# Patient Record
(demographics unavailable — no encounter records)

---

## 2025-03-08 NOTE — LETTER BODY
[FreeTextEntry1] : Gio Wilson MD [address], [place] [address] [place] [address], [address] [phone]  Dear Dr. Wilson,   REASON FOR VISIT: BPH. Recurrent UTI. Elevated PSA.   This is a 85-year-old gentleman with recurrent UTI, elevated PSA, and BPH. Patient is here today for follow-up. Since he was last seen, the patient reports taking Flomax BID and Proscar. Patient reports taking the medications regularly without any side effects or difficulties with the medication. He notes progressive urinary symptoms despite medical therapy. He denies any hematuria or urinary incontinence. His symptoms are aggravated by hydration. He has poorly controlled diabetes. Patient also reports recent UTI. He denies any pain. All other review of systems are negative. He has no cancer in his family medical history. He has no previous surgical history. Past medical history, family history and social history were inquired and were noncontributory to current condition. The patient does not use tobacco or drink alcohol. Medications and allergies were reviewed. He has no known allergies to medication.    On examination, the patient is a healthy-appearing gentleman in no acute distress. He is alert and oriented follows commands. He has normal mood and affect. He is normocephalic. Neck is supple. Oral no thrush. Respirations are unlabored. His abdomen is soft and nontender. Bladder is nonpalpable. No CVA tenderness. Neurologically he is grossly intact. No peripheral edema. Skin without gross abnormality.     His BMP demonstrated decreased renal functions, creatinine 1.64. His PSA was 4.92, which is elevated but age appropriate.  Post-void residual on bladder scan today was 100 cc which has improved. His previous PVR was 400 cc.    ASSESSMENT: BPH. Recurrent UTI. Elevated PSA.    I counseled the patient. In terms of his BPH, the patient reported progressive urinary symptoms despite medical therapy. His PVR today was 100 cc. Patient has not treatment goals. I recommended the patient undergo cystoscopy and UDS to evaluate the urinary outlet. I counseled the patient regarding the procedure. The patient will take the necessary preparations for the procedure.  I renewed the patient's prescription for Flomax BID and Proscar today. I encouraged the patient to continue medications regularly as directed. In terms of his elevated, his PSA is age appropriate. I recommended the patient repeat PSA and BMP to ensure stability. In terms of his recurrent UTI, I recommended the patient obtain urinalysis and urine culture to evaluate for infections. Risks and alternatives were discussed. I answered the patient questions. The patient will follow-up as directed and will contact me with any questions or concerns. Thank you for the opportunity to participate in the care of Mr. NUNES. I will keep you updated on his progress.    Plan: Cystoscopy and UDS. Continue Flomax BID and Proscar. PSA. BMP. Urinalysis. Urine culture. PVR. Follow up in 1 month.  I spent 30-minutes time today on all issues related to this patient on today date of service including non face to face time.

## 2025-03-08 NOTE — LETTER BODY
[FreeTextEntry1] : Gio Wilson MD [address], [place] [address] [place] [address], [address] [phone]  Dear Dr. Wilson,   REASON FOR VISIT: BPH. Recurrent UTI. Elevated PSA.   This is a 85-year-old gentleman with recurrent UTI, elevated PSA, and BPH. Patient is here today for follow-up. Since he was last seen, the patient reports taking Flomax BID and Proscar. Patient reports taking the medications regularly without any side effects or difficulties with the medication. He notes progressive urinary symptoms despite medical therapy. He denies any hematuria or urinary incontinence. His symptoms are aggravated by hydration. He has poorly controlled diabetes. Patient also reports recent UTI. He denies any pain. All other review of systems are negative. He has no cancer in his family medical history. He has no previous surgical history. Past medical history, family history and social history were inquired and were noncontributory to current condition. The patient does not use tobacco or drink alcohol. Medications and allergies were reviewed. He has no known allergies to medication.    On examination, the patient is a healthy-appearing gentleman in no acute distress. He is alert and oriented follows commands. He has normal mood and affect. He is normocephalic. Neck is supple. Oral no thrush. Respirations are unlabored. His abdomen is soft and nontender. Bladder is nonpalpable. No CVA tenderness. Neurologically he is grossly intact. No peripheral edema. Skin without gross abnormality.     His BMP demonstrated decreased renal functions, creatinine 1.64. His PSA was 4.92, which is elevated but age appropriate.  Post-void residual on bladder scan today was 100 cc which has improved. His previous PVR was 400 cc.    ASSESSMENT: BPH. Recurrent UTI. Elevated PSA.    I counseled the patient. In terms of his BPH, the patient reported progressive urinary symptoms despite medical therapy. His PVR today was 100 cc. Patient has not treatment goals. I recommended the patient undergo cystoscopy and UDS to evaluate the urinary outlet. I counseled the patient regarding the procedure. The patient will take the necessary preparations for the procedure.  I renewed the patient's prescription for Flomax BID and Proscar today. I encouraged the patient to continue medications regularly as directed. In terms of his elevated, his PSA is age appropriate. I recommended the patient repeat PSA and BMP to ensure stability. In terms of his recurrent UTI, I recommended the patient obtain urinalysis and urine culture to evaluate for infections. Risks and alternatives were discussed. I answered the patient questions. The patient will follow-up as directed and will contact me with any questions or concerns. Thank you for the opportunity to participate in the care of Mr. NUNES. I will keep you updated on his progress.    Plan: Cystoscopy and UDS. Continue Flomax BID and Proscar. PSA. BMP. Urinalysis. Urine culture. PVR. Follow up in 1 month.  I spent 30-minutes time today on all issues related to this patient on today date of service including non face to face time.

## 2025-03-08 NOTE — HISTORY OF PRESENT ILLNESS
[FreeTextEntry1] : Follow-up for BPH, on Flomax BID and Proscar QD, reports improved symptom, but weak urine stream. PVR 100ml today Follow-up for elevated PSA, PSA 4.921 in June 2024 Follow-up for UTI symptom, patient denied any symptom currently   Please refer to URO Consult Note.

## 2025-03-08 NOTE — ADDENDUM
[FreeTextEntry1] : Entered by Vinayak Quispe, acting as scribe for Dr. Steve Kinsey. The documentation recorded by the scribe accurately reflects the service I personally performed and the decisions made by me.